# Patient Record
Sex: MALE | Race: WHITE | NOT HISPANIC OR LATINO | URBAN - METROPOLITAN AREA
[De-identification: names, ages, dates, MRNs, and addresses within clinical notes are randomized per-mention and may not be internally consistent; named-entity substitution may affect disease eponyms.]

---

## 2021-01-08 ENCOUNTER — IMMUNIZATIONS (OUTPATIENT)
Dept: FAMILY MEDICINE CLINIC | Facility: HOSPITAL | Age: 81
End: 2021-01-08

## 2021-01-08 DIAGNOSIS — Z23 ENCOUNTER FOR IMMUNIZATION: ICD-10-CM

## 2021-01-08 PROCEDURE — 91300 SARS-COV-2 / COVID-19 MRNA VACCINE (PFIZER-BIONTECH) 30 MCG: CPT

## 2021-01-08 PROCEDURE — 0001A SARS-COV-2 / COVID-19 MRNA VACCINE (PFIZER-BIONTECH) 30 MCG: CPT

## 2021-01-29 ENCOUNTER — IMMUNIZATIONS (OUTPATIENT)
Dept: FAMILY MEDICINE CLINIC | Facility: HOSPITAL | Age: 81
End: 2021-01-29

## 2021-01-29 DIAGNOSIS — Z23 ENCOUNTER FOR IMMUNIZATION: Primary | ICD-10-CM

## 2021-01-29 PROCEDURE — 0002A SARS-COV-2 / COVID-19 MRNA VACCINE (PFIZER-BIONTECH) 30 MCG: CPT

## 2021-01-29 PROCEDURE — 91300 SARS-COV-2 / COVID-19 MRNA VACCINE (PFIZER-BIONTECH) 30 MCG: CPT

## 2025-04-30 ENCOUNTER — TELEPHONE (OUTPATIENT)
Age: 85
End: 2025-04-30

## 2025-04-30 NOTE — TELEPHONE ENCOUNTER
New Patient      Insurance: [facility] [employer] PPO  Current Insurance? yes     Insurance E-verified? yes  History:   Reason for appointment/active symptoms?     Intermittent blood in urine  Has the patient had any previous Urologist(s)?      10 yrs?  Was the patient seen in the ED?      Labs/Imaging(Including Out Of Network)? no     Records Requested? no  Records Visible in EPIC? yes     Personal history of cancer? no     Appointment:   Office location preference:  [facility]  ?   Appointment Details:   Date:  05/05/25  Time:  8:30  Location:[facility]    Provider:    Does the appointment need further review? YES - Pt states blood in urine is heavier in the pm he is also on [employer]

## 2025-05-05 ENCOUNTER — OFFICE VISIT (OUTPATIENT)
Dept: UROLOGY | Facility: MEDICAL CENTER | Age: 85
End: 2025-05-05
Payer: COMMERCIAL

## 2025-05-05 VITALS
WEIGHT: 206.6 LBS | OXYGEN SATURATION: 96 % | HEART RATE: 81 BPM | DIASTOLIC BLOOD PRESSURE: 80 MMHG | HEIGHT: 70 IN | SYSTOLIC BLOOD PRESSURE: 128 MMHG | BODY MASS INDEX: 29.58 KG/M2

## 2025-05-05 DIAGNOSIS — N40.1 BPH WITH URINARY OBSTRUCTION: ICD-10-CM

## 2025-05-05 DIAGNOSIS — E11.9 DIET-CONTROLLED TYPE 2 DIABETES MELLITUS (HCC): ICD-10-CM

## 2025-05-05 DIAGNOSIS — R31.0 GROSS HEMATURIA: Primary | ICD-10-CM

## 2025-05-05 DIAGNOSIS — N18.31 STAGE 3A CHRONIC KIDNEY DISEASE (HCC): ICD-10-CM

## 2025-05-05 DIAGNOSIS — N13.8 BPH WITH URINARY OBSTRUCTION: ICD-10-CM

## 2025-05-05 LAB
ANION GAP SERPL CALCULATED.3IONS-SCNC: 7 MMOL/L (ref 3–11)
BUN SERPL-MCNC: 24 MG/DL (ref 7–28)
CALCIUM SERPL-MCNC: 9.2 MG/DL (ref 8.5–10.5)
CHLORIDE SERPL-SCNC: 109 MMOL/L (ref 100–109)
CO2 SERPL-SCNC: 25 MMOL/L (ref 21–31)
CREAT SERPL-MCNC: 1.13 MG/DL (ref 0.53–1.3)
CYTOLOGY CMNT CVX/VAG CYTO-IMP: ABNORMAL
ERYTHROCYTE [DISTWIDTH] IN BLOOD BY AUTOMATED COUNT: 15.2 % (ref 12–16)
GFR/BSA.PRED SERPLBLD CYS-BASED-ARV: 64 ML/MIN/{1.73_M2}
GLUCOSE SERPL-MCNC: 114 MG/DL (ref 65–99)
HCT VFR BLD AUTO: 44.3 % (ref 37–48)
HGB BLD-MCNC: 14.9 G/DL (ref 12.5–17)
MCH RBC QN AUTO: 31.4 PG (ref 27–36)
MCHC RBC AUTO-ENTMCNC: 33.6 G/DL (ref 32–37)
MCV RBC AUTO: 93 FL (ref 80–100)
PLATELET # BLD AUTO: 146 THOU/CMM (ref 140–350)
PMV BLD REES-ECKER: 9.2 FL (ref 7.5–11.3)
POST-VOID RESIDUAL VOLUME, ML POC: 30 ML
POTASSIUM SERPL-SCNC: 4.9 MMOL/L (ref 3.5–5.2)
RBC # BLD AUTO: 4.75 MILL/CMM (ref 4–5.4)
SL AMB  POCT GLUCOSE, UA: 100
SL AMB LEUKOCYTE ESTERASE,UA: ABNORMAL
SL AMB POCT BILIRUBIN,UA: ABNORMAL
SL AMB POCT BLOOD,UA: ABNORMAL
SL AMB POCT CLARITY,UA: ABNORMAL
SL AMB POCT COLOR,UA: ABNORMAL
SL AMB POCT KETONES,UA: ABNORMAL
SL AMB POCT NITRITE,UA: POSITIVE
SL AMB POCT PH,UA: 8.5
SL AMB POCT SPECIFIC GRAVITY,UA: 1.02
SL AMB POCT URINE PROTEIN: >=300
SL AMB POCT UROBILINOGEN: 1
SODIUM SERPL-SCNC: 141 MMOL/L (ref 135–145)
WBC # BLD AUTO: 7.1 THOU/CMM (ref 4–10.5)

## 2025-05-05 PROCEDURE — 99204 OFFICE O/P NEW MOD 45 MIN: CPT | Performed by: UROLOGY

## 2025-05-05 PROCEDURE — 51798 US URINE CAPACITY MEASURE: CPT | Performed by: UROLOGY

## 2025-05-05 PROCEDURE — 81003 URINALYSIS AUTO W/O SCOPE: CPT | Performed by: UROLOGY

## 2025-05-05 PROCEDURE — 88112 CYTOPATH CELL ENHANCE TECH: CPT | Performed by: PATHOLOGY

## 2025-05-05 PROCEDURE — 87086 URINE CULTURE/COLONY COUNT: CPT | Performed by: UROLOGY

## 2025-05-05 RX ORDER — MULTIVIT-MIN/IRON/FOLIC ACID/K 18-600-40
500 CAPSULE ORAL DAILY
COMMUNITY

## 2025-05-05 RX ORDER — METOPROLOL SUCCINATE 50 MG/1
TABLET, EXTENDED RELEASE ORAL
COMMUNITY
Start: 2025-04-23

## 2025-05-05 RX ORDER — FUROSEMIDE 20 MG/1
20 TABLET ORAL AS NEEDED
COMMUNITY

## 2025-05-05 RX ORDER — LOSARTAN POTASSIUM 50 MG/1
50 TABLET ORAL 2 TIMES DAILY
COMMUNITY
Start: 2025-01-23

## 2025-05-05 RX ORDER — ATORVASTATIN CALCIUM 10 MG/1
TABLET, FILM COATED ORAL
COMMUNITY
Start: 2025-04-23

## 2025-05-05 RX ORDER — FEXOFENADINE HCL 180 MG/1
180 TABLET ORAL
COMMUNITY

## 2025-05-05 RX ORDER — DIPHENOXYLATE HYDROCHLORIDE AND ATROPINE SULFATE 2.5; .025 MG/1; MG/1
1 TABLET ORAL DAILY
COMMUNITY

## 2025-05-05 RX ORDER — SOLIFENACIN SUCCINATE 10 MG/1
10 TABLET, FILM COATED ORAL DAILY
COMMUNITY

## 2025-05-05 RX ORDER — CEPHALEXIN 500 MG/1
500 CAPSULE ORAL EVERY 12 HOURS SCHEDULED
Qty: 14 CAPSULE | Refills: 0 | Status: SHIPPED | OUTPATIENT
Start: 2025-05-05 | End: 2025-05-12

## 2025-05-05 RX ORDER — LEVOTHYROXINE SODIUM 137 UG/1
137 TABLET ORAL DAILY
COMMUNITY

## 2025-05-05 NOTE — PROGRESS NOTES
Maxwell ramsey has been made  01:15 pm  time    Name: Louis David Sr      : 1940      MRN: [id number]  Encounter Provider: Dewayne Lopez MD  Encounter Date: 2025   [facility]  :  Assessment & Plan  Gross hematuria  Painless gross hematuria x 1 week  UDip positive for nitrates and large leukocyctes  PVR 30 cc on bladder scan   - UCX  - Urine cytology  - start Keflex x 1 week, script sent  - CBC and BMP  - discussed holding Eliquis if hematuria worsens with blood clots or difficulty voiding  - schedule for CT urogram  - schedule for cystoscopy     Orders:    POCT urine dip auto non-scope    Urine culture    Cytology, urine    CT renal protocol; Future    cephalexin (KEFLEX) 500 mg capsule; Take 1 capsule (500 mg total) by mouth every 12 (twelve) hours for 7 days    CBC and Platelet; Future    Basic metabolic panel; Future    POCT Measure PVR    BPH with urinary obstruction  BPH s/p Greenlight laser with Dr Lazaro around  per patient  Currently managed on Vesicare  PVR 30 cc  - continue Vesicare 10 mg   Orders:    POCT Measure PVR    Diet-controlled type 2 diabetes mellitus (HCC)    Lab Results   Component Value Date    HGBA1C 5.9 (H) 2024            Stage 3a chronic kidney disease (HCC)  Lab Results   Component Value Date    EGFR 64 2025    EGFR 47 (L) 2024    EGFR 66 2024    CREATININE 1.14 2025    CREATININE 1.46 (H) 2024    CREATININE 1.11 2024                History of Present Illness   Louis David Sr is a 84 y.o. male who presents with gross hematuria for the past week.        Reports painless gross hematuria, no associated dysuria, no flank pain. No significant change in frequency or urgency.  Nocturia x 2    Has been taking Vesicare for several years    Takes Eliquis for Afib, has ICD.      No smoking history  Distant history of kidney stones  Had Greenlight laser procedure with Dr Lazaro for BPH around     Last CT scan was in 2018 and  "showed enlarged prostate, no hydronephrosis or kidney stones.        Review of Systems   All other systems reviewed and are negative.    Past Medical History   Past Medical History:   Diagnosis Date    Atrial fibrillation (HCC)     Hypertension      Past Surgical History:   Procedure Laterality Date    CARDIAC DEFIBRILLATOR PLACEMENT  12/2024    CARDIAC ELECTROPHYSIOLOGY MAPPING AND ABLATION  12/2024     History reviewed. No pertinent family history.   reports that he has never smoked. He has never used smokeless tobacco. He reports that he does not currently use alcohol. He reports that he does not currently use drugs.  Current Outpatient Medications   Medication Instructions    apixaban (ELIQUIS) 5 mg, 2 times daily    atorvastatin (LIPITOR) 10 mg tablet     fexofenadine (ALLEGRA) 180 mg    furosemide (LASIX) 20 mg, As needed    levothyroxine 137 mcg, Daily    losartan (COZAAR) 50 mg, 2 times daily    metoprolol succinate (TOPROL-XL) 50 mg 24 hr tablet     multivitamin (THERAGRAN) TABS 1 tablet, Daily    solifenacin (VESICARE) 10 mg, Daily    Vitamin C 500 mg, Daily     Allergies   Allergen Reactions    Sulfa Antibiotics Hives and Rash     hives         Objective   /80 (BP Location: Left arm, Patient Position: Sitting, Cuff Size: Adult)   Pulse 81   Ht 5' 10\" (1.778 m)   Wt 93.7 kg (206 lb 9.6 oz)   SpO2 96%   BMI 29.64 kg/m²     Physical Exam  Vitals and nursing note reviewed.   Constitutional:       General: He is not in acute distress.     Appearance: He is well-developed.   HENT:      Head: Normocephalic and atraumatic.   Eyes:      Conjunctiva/sclera: Conjunctivae normal.   Cardiovascular:      Rate and Rhythm: Normal rate and regular rhythm.      Heart sounds: No murmur heard.  Pulmonary:      Effort: Pulmonary effort is normal. No respiratory distress.      Breath sounds: Normal breath sounds.   Abdominal:      Palpations: Abdomen is soft.      Tenderness: There is no abdominal tenderness. " "  Musculoskeletal:         General: No swelling.      Cervical back: Neck supple.   Skin:     General: Skin is warm and dry.      Capillary Refill: Capillary refill takes less than 2 seconds.   Neurological:      Mental Status: He is alert.   Psychiatric:         Mood and Affect: Mood normal.          Results   No results found for: \"PSA\"  Lab Results   Component Value Date    CALCIUM 9 03/03/2025    K 4.2 03/03/2025    CO2 26 03/03/2025     03/03/2025    BUN 20 03/03/2025    CREATININE 1.14 03/03/2025     No results found for: \"WBC\", \"HGB\", \"HCT\", \"MCV\", \"PLT\"    Office Urine Dip  No results found for this or any previous visit (from the past hour).      "

## 2025-05-05 NOTE — ASSESSMENT & PLAN NOTE
Lab Results   Component Value Date    EGFR 64 03/03/2025    EGFR 47 (L) 12/18/2024    EGFR 66 12/02/2024    CREATININE 1.14 03/03/2025    CREATININE 1.46 (H) 12/18/2024    CREATININE 1.11 12/02/2024

## 2025-05-06 ENCOUNTER — NURSE TRIAGE (OUTPATIENT)
Age: 85
End: 2025-05-06

## 2025-05-06 NOTE — TELEPHONE ENCOUNTER
FOLLOW UP: I reviewed ED precautions     REASON FOR CONVERSATION: Lower abdominal pain    SYMPTOMS: Left lower abdomen    OTHER: Pain started today, constant, unable to qualify pain, rates pain 8-9/10. Denies fever, nausea, vomiting, or urinary symptoms. Took Tylenol and states it has provided some relief. Started Keflex yesterday for hematuria.     DISPOSITION: Discuss with Provider and Call Back Patient (overriding See Within 2 Weeks in Office)    Reason for Disposition   Mild/moderate pain, history of stones but no recent imaging    Answer Assessment - Initial Assessment Questions  1. When did this pain start?   Today    2. Where is your pain located at? (upper, lower abdomen, right, left, or both sides or somewhere else?)   Left lower abdomen    3. Does your pain radiate anywhere?   No    4. Can you qualify the pain: constant, intermittent, sharp, dull, aching? Pain scale?   Constant, 8-9/10    5. Do you have nausea or vomiting?   Denies    6. Do you have a fever? If yes, what was it and have you taken any medication for the fever?  Denies    7. Do you have any urinary symptoms?   Denies    8. Do you have a history of kidney stones, UTI's, bladder dysfunction or interstitial cystitis?   History of kidney stones    9. Have you had recent urologic procedure or surgery, seen OB GYN, URO GYN, or a PCP for this problem? If yes, what was the procedure and/or provider seen and did you have any recent imaging? (Kidney ultrasound, pelvic ultrasound, KUB or CT scan)   No    10. Have you been prescribed any medication for this problem?   No    Protocols used: Urology- Bladder / Pelvic Pain-ADULT-[place]

## 2025-05-07 LAB — BACTERIA UR CULT: NORMAL

## 2025-05-07 PROCEDURE — 88112 CYTOPATH CELL ENHANCE TECH: CPT | Performed by: PATHOLOGY

## 2025-05-07 NOTE — TELEPHONE ENCOUNTER
Patient's wife, Mary Ann, called informing the office that the patient was taken via EMS to [facility] ED and he has been admitted.

## 2025-05-07 NOTE — TELEPHONE ENCOUNTER
CT scan is scheduled for 5/9. Agree with ED precautions. May be a kidney stone. Would encourage increasing water. Can trial flomax if no concern for falls.

## 2025-05-09 NOTE — TELEPHONE ENCOUNTER
Hospital called patient today and changed the abx, was on Keflex and now changed to Cipro due to final UCX    Encouraged to increase water to 64 ounces daily, decrease bladder irritants, call back if new or worsening symptoms    Confirmed Cysto appointment on 6/10/25

## 2025-05-15 ENCOUNTER — RESULTS FOLLOW-UP (OUTPATIENT)
Dept: UROLOGY | Facility: MEDICAL CENTER | Age: 85
End: 2025-05-15

## 2025-05-15 NOTE — TELEPHONE ENCOUNTER
Call placed to Pt and spoke to both him and his wife who inform me that Patient was not feeling well and was taken to [facility] in an Ambulance and they culture his urine and his Antibiotics was changed to Cipro and he still have to pills left to finish the course. Pt is feeling much better.

## 2025-05-15 NOTE — TELEPHONE ENCOUNTER
----- Message from Dewayne Lopez MD sent at 5/15/2025 12:48 PM EDT -----  Ucx mixed contaminants, was given script for keflex in office at time of culture  Urine cytology negative  ----- Message -----  From: Kelsey Thompson MA  Sent: 5/5/2025   8:28 AM EDT  To: Dewayne Lopez MD

## 2025-05-28 ENCOUNTER — TELEPHONE (OUTPATIENT)
Dept: UROLOGY | Facility: MEDICAL CENTER | Age: 85
End: 2025-05-28

## 2025-05-28 NOTE — TELEPHONE ENCOUNTER
Spoke to patient and wife, Mary Ann, requesting patient get his CT of the kidneys done.  Mary Ann stated patient was in the ER earlier this month and the hospital did a CT and US.  She feels it is not necessary to get the CT done.  Patient then asked if it was still necessary to have the cystoscopy done.  I stated the reason for the cysto was because of the visible blood in the urine, and since patient had a recent visit to the ER for gross blood, it would be a good idea to keep cysto appt.  Mary Ann agreed patient should proceed with cystoscopy.

## 2025-06-10 ENCOUNTER — PROCEDURE VISIT (OUTPATIENT)
Dept: UROLOGY | Facility: MEDICAL CENTER | Age: 85
End: 2025-06-10
Payer: COMMERCIAL

## 2025-06-10 VITALS
OXYGEN SATURATION: 98 % | WEIGHT: 199 LBS | SYSTOLIC BLOOD PRESSURE: 112 MMHG | HEART RATE: 84 BPM | BODY MASS INDEX: 28.49 KG/M2 | HEIGHT: 70 IN | DIASTOLIC BLOOD PRESSURE: 60 MMHG

## 2025-06-10 DIAGNOSIS — R31.0 GROSS HEMATURIA: ICD-10-CM

## 2025-06-10 DIAGNOSIS — N13.8 BPH WITH URINARY OBSTRUCTION: Primary | ICD-10-CM

## 2025-06-10 DIAGNOSIS — N40.1 BPH WITH URINARY OBSTRUCTION: Primary | ICD-10-CM

## 2025-06-10 LAB
POST-VOID RESIDUAL VOLUME, ML POC: 0 ML
SL AMB  POCT GLUCOSE, UA: ABNORMAL
SL AMB LEUKOCYTE ESTERASE,UA: ABNORMAL
SL AMB POCT BILIRUBIN,UA: ABNORMAL
SL AMB POCT BLOOD,UA: ABNORMAL
SL AMB POCT CLARITY,UA: CLEAR
SL AMB POCT COLOR,UA: YELLOW
SL AMB POCT KETONES,UA: ABNORMAL
SL AMB POCT NITRITE,UA: ABNORMAL
SL AMB POCT PH,UA: 5.5
SL AMB POCT SPECIFIC GRAVITY,UA: 1.02
SL AMB POCT URINE PROTEIN: ABNORMAL
SL AMB POCT UROBILINOGEN: 0.2

## 2025-06-10 PROCEDURE — 52000 CYSTOURETHROSCOPY: CPT | Performed by: UROLOGY

## 2025-06-10 PROCEDURE — 51798 US URINE CAPACITY MEASURE: CPT | Performed by: UROLOGY

## 2025-06-10 PROCEDURE — 81003 URINALYSIS AUTO W/O SCOPE: CPT | Performed by: UROLOGY

## 2025-06-10 NOTE — PROGRESS NOTES
"   Cystoscopy     Date/Time  6/10/2025 2:30 PM     Performed by  Dewayne Lopez MD   Authorized by  Dewayne Lopez MD       Universal Protocol:  procedure performed by consultantConsent: Verbal consent obtained. Written consent obtained  Risks and benefits: risks, benefits and alternatives were discussed  Consent given by: patient  Time out: Immediately prior to procedure a \"time out\" was called to verify the correct patient, procedure, equipment, support staff and site/side marked as required.  Timeout called at: 6/10/2025 2:42 PM.  Patient understanding: patient states understanding of the procedure being performed  Patient consent: the patient's understanding of the procedure matches consent given  Procedure consent: procedure consent matches procedure scheduled  Relevant documents: relevant documents present and verified  Test results: test results available and properly labeled  Site marked: the operative site was not marked  Radiology Images displayed and confirmed. If images not available, report reviewed: imaging studies available  Required items: required blood products, implants, devices, and special equipment available  Patient identity confirmed: verbally with patient      Procedure Details:  Procedure type: cystoscopy    Patient tolerance: Patient tolerated the procedure well with no immediate complications    Additional Procedure Details: Office Cystoscopy Procedure Note    Indication:    Hematuria    Informed consent   The risks, benefits, complications, treatment options, and expected outcomes were discussed with the patient. The patient concurred with the proposed plan and provided informed consent.    Anesthesia  Lidocaine jelly 2%    Antibiotic prophylaxis   None    Procedure  The patient was placed in the supineposition, was prepped and draped in the usual manner using sterile technique, and 2% lidocaine jelly instilled into the urethra.  A 17 F flexible cystoscope was then inserted into " the urethra and the urethra and bladder carefully examined.  The following findings were noted:    Findings:  Urethra:  Normal  Prostate:  Well resected prostatic fossa  Bladder:  Normal mucosa, severe trabeculation, small cellules, no large diverticulum, no stones, no tumors  Ureteral orifices:  Orthotopic with clear efflux of urine  Other findings:  None, retroflexed view confirms    Specimens: None                 Complications:    None; patient tolerated the procedure well           Disposition: To home            Condition: Stable    Plan:     CT scan at [facility] images reviewed and show two left renal pelvis stones, the larger stone 1.2 cm in size    Urine cytology negative  Cystoscopy negative for stones or tumors      Discussed with patient management of left renal pelvis stones up to 1.2 cm in size  Patient does not wish to have any surgical intervention for stones at this time  He is currently asymptomatic with no left flank pain, no fevers/chills, no nausea/vomiting and no gross hematuria    ED precautions provided to patient including flank pain with fever/chills, inability tolerate PO intake, and uncontrolled pain